# Patient Record
Sex: FEMALE | Race: WHITE | NOT HISPANIC OR LATINO | URBAN - METROPOLITAN AREA
[De-identification: names, ages, dates, MRNs, and addresses within clinical notes are randomized per-mention and may not be internally consistent; named-entity substitution may affect disease eponyms.]

---

## 2018-04-09 NOTE — PATIENT DISCUSSION
New Prescription: Latisse (bimatoprost): drops with applicator: 2.18% 1 drop once a day as directed to affected area 04-

## 2018-04-09 NOTE — PATIENT DISCUSSION
DRY EYES : Discussed with patient the importance of keeping the eye moist and the symptoms associated with dry eyes including blurry vision, tearing, burning, and matheus sensation. Advised patient to minimize use of any fans blowing directly on the face. Advised patient to continue with artificial tears 2-3 times daily.

## 2019-04-15 NOTE — PATIENT DISCUSSION
DRY EYES : Discussed with patient the importance of keeping the eye moist and the symptoms associated with dry eyes including blurry vision, tearing, burning, and matheus sensation. Tear Lab was performed today to evaluate the severity of dryness. Advised patient to minimize use of any fans blowing directly on the face. Advised patient to continue with over the counter artificial tears 2-3 times daily.

## 2022-09-01 ENCOUNTER — ESTABLISHED PATIENT (OUTPATIENT)
Facility: LOCATION | Age: 62
End: 2022-09-01

## 2022-09-01 DIAGNOSIS — H52.4: ICD-10-CM

## 2022-09-01 PROCEDURE — 92015KEC REFRACTION KEC

## 2022-09-01 PROCEDURE — 92014 COMPRE OPH EXAM EST PT 1/>: CPT

## 2022-09-01 ASSESSMENT — VISUAL ACUITY
OD_CC: J4
OS_CC: 20/30-1
OD_PH: 20/30
OD_CC: 20/40-1
OS_CC: J4

## 2022-09-01 ASSESSMENT — TONOMETRY
OS_IOP_MMHG: 13
OD_IOP_MMHG: 13

## 2022-12-20 ENCOUNTER — ESTABLISHED PATIENT (OUTPATIENT)
Facility: LOCATION | Age: 62
End: 2022-12-20

## 2022-12-20 PROCEDURE — 99213 OFFICE O/P EST LOW 20 MIN: CPT

## 2022-12-20 ASSESSMENT — VISUAL ACUITY
OD_CC: 20/25
OS_CC: 20/25

## 2022-12-20 ASSESSMENT — TONOMETRY
OD_IOP_MMHG: 13
OS_IOP_MMHG: 13

## 2023-12-15 ENCOUNTER — ESTABLISHED PATIENT (OUTPATIENT)
Facility: LOCATION | Age: 63
End: 2023-12-15

## 2023-12-15 DIAGNOSIS — H10.45: ICD-10-CM

## 2023-12-15 DIAGNOSIS — H52.4: ICD-10-CM

## 2023-12-15 DIAGNOSIS — H16.223: ICD-10-CM

## 2023-12-15 DIAGNOSIS — H25.813: ICD-10-CM

## 2023-12-15 DIAGNOSIS — H43.813: ICD-10-CM

## 2023-12-15 PROCEDURE — 92015KEC REFRACTION KEC

## 2023-12-15 PROCEDURE — 92014 COMPRE OPH EXAM EST PT 1/>: CPT

## 2023-12-15 ASSESSMENT — TONOMETRY
OS_IOP_MMHG: 13
OD_IOP_MMHG: 13

## 2023-12-15 ASSESSMENT — VISUAL ACUITY
OS_CC: 20/30+3
OS_CC: J2
OD_CC: 20/30+2
OD_CC: J2

## 2024-12-16 ENCOUNTER — COMPREHENSIVE EXAM (OUTPATIENT)
Age: 64
End: 2024-12-16

## 2024-12-16 DIAGNOSIS — H52.4: ICD-10-CM

## 2024-12-16 DIAGNOSIS — H25.813: ICD-10-CM

## 2024-12-16 PROCEDURE — 92014 COMPRE OPH EXAM EST PT 1/>: CPT

## 2024-12-16 PROCEDURE — 92015KEC REFRACTION KEC

## 2025-01-23 ENCOUNTER — CONSULTATION/EVALUATION (OUTPATIENT)
Age: 65
End: 2025-01-23

## 2025-01-23 VITALS — WEIGHT: 230 LBS | HEIGHT: 67 IN | BODY MASS INDEX: 36.1 KG/M2

## 2025-01-23 DIAGNOSIS — H25.813: ICD-10-CM

## 2025-01-23 PROCEDURE — 92136 OPHTHALMIC BIOMETRY: CPT

## 2025-01-23 PROCEDURE — 99214 OFFICE O/P EST MOD 30 MIN: CPT

## 2025-01-23 PROCEDURE — 92134 CPTRZ OPH DX IMG PST SGM RTA: CPT | Mod: NC

## 2025-02-18 ENCOUNTER — TECH ONLY (OUTPATIENT)
Age: 65
End: 2025-02-18

## 2025-02-18 DIAGNOSIS — H25.813: ICD-10-CM

## 2025-02-18 PROCEDURE — 99211T TECH SERVICE: Mod: NC

## 2025-03-12 ENCOUNTER — SURGERY/PROCEDURE (OUTPATIENT)
Age: 65
End: 2025-03-12

## 2025-03-12 DIAGNOSIS — H25.811: ICD-10-CM

## 2025-03-12 PROCEDURE — 66984AV REMOVE CATARACT, INSERT ADVANCED LENS

## 2025-03-13 ENCOUNTER — POST-OP (OUTPATIENT)
Age: 65
End: 2025-03-13

## 2025-03-13 DIAGNOSIS — Z96.1: ICD-10-CM

## 2025-03-19 ENCOUNTER — SURGERY/PROCEDURE (OUTPATIENT)
Age: 65
End: 2025-03-19

## 2025-03-19 DIAGNOSIS — H25.812: ICD-10-CM

## 2025-03-19 PROCEDURE — 66984AV REMOVE CATARACT, INSERT ADVANCED LENS: Mod: 79,LT

## 2025-03-20 ENCOUNTER — POST-OP (OUTPATIENT)
Age: 65
End: 2025-03-20

## 2025-03-20 DIAGNOSIS — Z96.1: ICD-10-CM

## 2025-03-20 PROCEDURE — 99024 POSTOP FOLLOW-UP VISIT: CPT

## 2025-03-28 ENCOUNTER — POST-OP (OUTPATIENT)
Age: 65
End: 2025-03-28

## 2025-03-28 DIAGNOSIS — Z96.1: ICD-10-CM

## 2025-03-28 PROCEDURE — 99024 POSTOP FOLLOW-UP VISIT: CPT

## 2025-04-30 ENCOUNTER — POST-OP (OUTPATIENT)
Age: 65
End: 2025-04-30

## 2025-04-30 DIAGNOSIS — Z96.1: ICD-10-CM

## 2025-04-30 DIAGNOSIS — H26.492: ICD-10-CM

## 2025-04-30 DIAGNOSIS — H43.813: ICD-10-CM

## 2025-04-30 DIAGNOSIS — H16.223: ICD-10-CM

## 2025-04-30 DIAGNOSIS — H52.4: ICD-10-CM

## 2025-04-30 PROCEDURE — 99024 POSTOP FOLLOW-UP VISIT: CPT

## 2025-06-04 ENCOUNTER — POST-OP (OUTPATIENT)
Age: 65
End: 2025-06-04

## 2025-06-04 DIAGNOSIS — H26.493: ICD-10-CM

## 2025-06-04 DIAGNOSIS — Z96.1: ICD-10-CM

## 2025-06-04 DIAGNOSIS — H43.813: ICD-10-CM

## 2025-06-04 DIAGNOSIS — H52.4: ICD-10-CM

## 2025-06-04 DIAGNOSIS — H16.223: ICD-10-CM

## 2025-06-04 PROCEDURE — 99024 POSTOP FOLLOW-UP VISIT: CPT

## 2025-06-26 ENCOUNTER — CONSULTATION/EVALUATION (OUTPATIENT)
Age: 65
End: 2025-06-26

## 2025-06-26 DIAGNOSIS — Z96.1: ICD-10-CM

## 2025-06-26 DIAGNOSIS — H26.493: ICD-10-CM

## 2025-06-26 PROCEDURE — 99214 OFFICE O/P EST MOD 30 MIN: CPT | Mod: 57

## 2025-06-26 PROCEDURE — 66821 AFTER CATARACT LASER SURGERY: CPT | Mod: 50

## 2025-07-17 ENCOUNTER — COMPREHENSIVE EXAM (OUTPATIENT)
Age: 65
End: 2025-07-17

## 2025-07-17 DIAGNOSIS — H00.022: ICD-10-CM

## 2025-07-17 DIAGNOSIS — H01.014: ICD-10-CM

## 2025-07-17 DIAGNOSIS — H52.4: ICD-10-CM

## 2025-07-17 DIAGNOSIS — H01.011: ICD-10-CM

## 2025-07-17 DIAGNOSIS — H43.813: ICD-10-CM

## 2025-07-17 DIAGNOSIS — Z96.1: ICD-10-CM

## 2025-07-17 DIAGNOSIS — H16.223: ICD-10-CM

## 2025-07-17 PROCEDURE — 99024 POSTOP FOLLOW-UP VISIT: CPT
